# Patient Record
Sex: FEMALE | Race: BLACK OR AFRICAN AMERICAN | NOT HISPANIC OR LATINO | Employment: FULL TIME | URBAN - METROPOLITAN AREA
[De-identification: names, ages, dates, MRNs, and addresses within clinical notes are randomized per-mention and may not be internally consistent; named-entity substitution may affect disease eponyms.]

---

## 2022-04-09 ENCOUNTER — HOSPITAL ENCOUNTER (EMERGENCY)
Facility: HOSPITAL | Age: 22
Discharge: HOME | End: 2022-04-09
Attending: EMERGENCY MEDICINE
Payer: OTHER GOVERNMENT

## 2022-04-09 VITALS
HEIGHT: 66 IN | HEART RATE: 75 BPM | WEIGHT: 107 LBS | RESPIRATION RATE: 18 BRPM | DIASTOLIC BLOOD PRESSURE: 75 MMHG | BODY MASS INDEX: 17.19 KG/M2 | SYSTOLIC BLOOD PRESSURE: 123 MMHG | TEMPERATURE: 98 F | OXYGEN SATURATION: 99 %

## 2022-04-09 DIAGNOSIS — S39.012A BACK STRAIN, INITIAL ENCOUNTER: Primary | ICD-10-CM

## 2022-04-09 DIAGNOSIS — M54.6 THORACIC BACK PAIN: ICD-10-CM

## 2022-04-09 LAB
B-HCG UR QL: NEGATIVE
CTP QC/QA: YES

## 2022-04-09 PROCEDURE — 99283 EMERGENCY DEPT VISIT LOW MDM: CPT | Mod: 25

## 2022-04-09 PROCEDURE — 25000003 PHARM REV CODE 250

## 2022-04-09 PROCEDURE — 81025 URINE PREGNANCY TEST: CPT | Performed by: EMERGENCY MEDICINE

## 2022-04-09 RX ORDER — IBUPROFEN 600 MG/1
600 TABLET ORAL EVERY 6 HOURS PRN
Qty: 20 TABLET | Refills: 0 | Status: SHIPPED | OUTPATIENT
Start: 2022-04-09

## 2022-04-09 RX ORDER — SERTRALINE HYDROCHLORIDE 50 MG/1
50 TABLET, FILM COATED ORAL 2 TIMES DAILY
COMMUNITY
Start: 2022-03-07

## 2022-04-09 RX ORDER — IBUPROFEN 400 MG/1
400 TABLET ORAL
Status: COMPLETED | OUTPATIENT
Start: 2022-04-09 | End: 2022-04-09

## 2022-04-09 RX ADMIN — IBUPROFEN 400 MG: 400 TABLET, FILM COATED ORAL at 03:04

## 2022-04-09 NOTE — ED TRIAGE NOTES
"Pt reports she was getting out of bed this AM and heard a "pop" in her back, started having pain in her upper back. Pain worse when she moved her arms or neck. Pain is 6/10. Denies taking pain meds PTA  "

## 2022-04-09 NOTE — DISCHARGE INSTRUCTIONS
Please take Motrin as needed for pain. Rest and heat for the next couple of days. If symptoms worsen please return to the ED.

## 2022-04-09 NOTE — ED PROVIDER NOTES
Encounter Date: 4/9/2022       History     Chief Complaint   Patient presents with    Back Pain     Patient reports was lying in bed, felt a popin her upper back.      Patient presents to the ED complaining of back pain since this morning.  She states she was getting out of bed and she heard a pop between her shoulder blades.  She states the pain is sharp and constant rating it a 6/10.  She states movement and lying down makes it worse.  She has not tried anything to make it better.  She denies shortness of breath, chest pain, numbness, and tingling.        Review of patient's allergies indicates:  No Known Allergies  Past Medical History:   Diagnosis Date    Depression      History reviewed. No pertinent surgical history.  History reviewed. No pertinent family history.  Social History     Tobacco Use    Smoking status: Never Smoker    Smokeless tobacco: Never Used   Substance Use Topics    Alcohol use: Never    Drug use: Never     Review of Systems   Respiratory: Negative for shortness of breath.    Cardiovascular: Negative for chest pain.   Musculoskeletal: Positive for back pain.   Neurological: Negative for numbness.       Physical Exam     Initial Vitals [04/09/22 1427]   BP Pulse Resp Temp SpO2   123/75 75 18 98 °F (36.7 °C) 99 %      MAP       --         Physical Exam    Nursing note and vitals reviewed.  Constitutional: Vital signs are normal. She appears well-developed and well-nourished. She is active.   HENT:   Head: Normocephalic and atraumatic.   Right Ear: External ear normal.   Left Ear: External ear normal.   Nose: Nose normal.   Eyes: Conjunctivae, EOM and lids are normal. Pupils are equal, round, and reactive to light.   Neck:   Normal range of motion.  Pulmonary/Chest: Effort normal. No respiratory distress.   Musculoskeletal:         General: Normal range of motion.      Cervical back: Normal range of motion. No swelling, deformity, erythema, tenderness or bony tenderness. Pain with  movement present.      Thoracic back: No swelling, edema, deformity, lacerations, tenderness or bony tenderness.      Lumbar back: Normal.     Neurological: She is alert and oriented to person, place, and time.   Skin: Skin is dry. Capillary refill takes less than 2 seconds.         ED Course   Procedures  Labs Reviewed   POCT URINE PREGNANCY          Imaging Results          X-Ray Chest PA And Lateral (Final result)  Result time 04/09/22 15:20:33    Final result by Any Perez MD (04/09/22 15:20:33)                 Impression:      Ill-defined patchy opacities at the lung apices could reflect apical pleural thickening but this would be unusual in this age group.  Findings may be artifactual but atypical infectious or inflammatory process is not excluded.  Thoracic CT could be used for further evaluation.      Electronically signed by: Any Perez MD  Date:    04/09/2022  Time:    15:20             Narrative:    EXAMINATION:  XR CHEST PA AND LATERAL    CLINICAL HISTORY:  Pain in thoracic spine    TECHNIQUE:  PA and lateral views of the chest were performed.    COMPARISON:  Prior dated 09/20/2021    FINDINGS:  The mediastinal structures are midline.  The cardiac silhouette is not enlarged.  Ill-defined patchy opacities at the lung apices could reflect apical pleural thickening but this would be unusual in this age group.  Findings may be artifactual but atypical infectious or inflammatory process is not excluded.  Thoracic CT could be used for further evaluation.    No osseous abnormalities are identified.                                 Medications   ibuprofen tablet 400 mg (has no administration in time range)     Medical Decision Making:   Initial Assessment:   Patient presents to ED complaining of back pain since this morning.  Patient's chart and medical history reviewed.  Differential Diagnosis:   Thoracic fracture  Muscle strain  Pneumothorax  ED Management:  Patient's vitals reviewed.  Chest  x-ray showed no abnormalities ruling out pneumothorax and thoracic fracture.  This is most likely due to muscle strain.  She was given 4 mg of ibuprofen for the pain.                       Clinical Impression:   Final diagnoses:  [M54.6] Thoracic back pain  [S39.012A] Back strain, initial encounter (Primary)          ED Disposition Condition    Discharge Stable        ED Prescriptions     Medication Sig Dispense Start Date End Date Auth. Provider    ibuprofen (ADVIL,MOTRIN) 600 MG tablet Take 1 tablet (600 mg total) by mouth every 6 (six) hours as needed for Pain. 20 tablet 4/9/2022  Alayna Holdsworth, PA-C        Follow-up Information     Follow up With Specialties Details Why Contact Info    Sivan Brand NP Family Medicine   [facility] [facility] [address]  [phone]             Alayna Holdsworth, PA-C  04/09/22 0633     Yes